# Patient Record
Sex: MALE | Race: WHITE | Employment: UNEMPLOYED | URBAN - METROPOLITAN AREA
[De-identification: names, ages, dates, MRNs, and addresses within clinical notes are randomized per-mention and may not be internally consistent; named-entity substitution may affect disease eponyms.]

---

## 2017-05-12 PROCEDURE — 84146 ASSAY OF PROLACTIN: CPT | Performed by: INTERNAL MEDICINE

## 2017-05-12 PROCEDURE — 82570 ASSAY OF URINE CREATININE: CPT | Performed by: INTERNAL MEDICINE

## 2017-05-12 PROCEDURE — 82043 UR ALBUMIN QUANTITATIVE: CPT | Performed by: INTERNAL MEDICINE

## 2017-05-16 PROBLEM — E11.40 TYPE 2 DIABETES, CONTROLLED, WITH NEUROPATHY (HCC): Status: ACTIVE | Noted: 2017-05-16

## 2017-06-06 ENCOUNTER — OFFICE VISIT (OUTPATIENT)
Dept: OPHTHALMOLOGY | Facility: CLINIC | Age: 57
End: 2017-06-06

## 2017-06-06 DIAGNOSIS — E11.3292 TYPE 2 DIABETES MELLITUS WITH LEFT EYE AFFECTED BY MILD NONPROLIFERATIVE RETINOPATHY WITHOUT MACULAR EDEMA, WITHOUT LONG-TERM CURRENT USE OF INSULIN (HCC): Primary | ICD-10-CM

## 2017-06-06 DIAGNOSIS — H25.13 AGE-RELATED NUCLEAR CATARACT OF BOTH EYES: ICD-10-CM

## 2017-06-06 PROBLEM — E11.9 DIABETES MELLITUS TYPE 2 WITHOUT RETINOPATHY (HCC): Status: ACTIVE | Noted: 2017-06-06

## 2017-06-06 PROBLEM — E11.319: Status: ACTIVE | Noted: 2017-06-06

## 2017-06-06 PROCEDURE — 92015 DETERMINE REFRACTIVE STATE: CPT | Performed by: OPHTHALMOLOGY

## 2017-06-06 PROCEDURE — 92004 COMPRE OPH EXAM NEW PT 1/>: CPT | Performed by: OPHTHALMOLOGY

## 2017-06-06 NOTE — PATIENT INSTRUCTIONS
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 No treatment necessary at this time.   Patient was instructed to m

## 2017-06-06 NOTE — ASSESSMENT & PLAN NOTE
Diabetes type II: mild background diabetic retinopathy in the left eye only, no signs of neovascularization noted. No treatment necessary at this time. Patient was instructed to monitor vision for sudden changes and to call if visual changes noted.   Disc

## 2017-06-06 NOTE — PROGRESS NOTES
Ulises Painter is a 56year old male.     HPI:     HPI     Diabetic Eye Exam    Additional comments: Pt has been a diabetic for 3 years  3 years on pills/  0 years on Insulin   Pt checks his BS once a day   Pt's last blood sugar was 150  Last HA1C was 6.6 on blood sugars twice a day (CANNOT DO 90-DAY SCRIPT AS PT OVERDUE FOR APPT) Disp: 60 strip Rfl: 1   Lancets Does not apply Misc Check BID Disp: 200 Each Rfl: 2       Allergies:    Dust                      Hornets                   Tree, [place] Right -3.00 Sphere  20/20- +2.25 20/20   Left -3.75 +1.25 100 20/20- +2.25 20/20         Final Rx      Sphere Cylinder Axis Add   Right -3.00 Sphere  +2.25   Left -3.75 +1.25 100 +2.25       Type:  Progressive bifocal                 ASSESSMENT/PLAN:

## 2017-10-10 PROCEDURE — 88305 TISSUE EXAM BY PATHOLOGIST: CPT | Performed by: INTERNAL MEDICINE

## 2017-12-17 PROBLEM — E11.65 UNCONTROLLED TYPE 2 DIABETES MELLITUS WITH HYPERGLYCEMIA, WITHOUT LONG-TERM CURRENT USE OF INSULIN (HCC): Status: ACTIVE | Noted: 2017-12-17

## 2017-12-17 PROBLEM — R79.89 ELEVATED TSH: Status: ACTIVE | Noted: 2017-12-17

## 2017-12-17 PROBLEM — E11.3292 UNCONTROLLED TYPE 2 DIABETES MELLITUS WITH LEFT EYE AFFECTED BY MILD NONPROLIFERATIVE RETINOPATHY WITHOUT MACULAR EDEMA, WITHOUT LONG-TERM CURRENT USE OF INSULIN (HCC): Status: ACTIVE | Noted: 2017-06-06

## 2017-12-17 PROBLEM — E11.319: Status: RESOLVED | Noted: 2017-06-06 | Resolved: 2017-12-17

## 2017-12-17 PROBLEM — E11.65 UNCONTROLLED TYPE 2 DIABETES MELLITUS WITH LEFT EYE AFFECTED BY MILD NONPROLIFERATIVE RETINOPATHY WITHOUT MACULAR EDEMA, WITHOUT LONG-TERM CURRENT USE OF INSULIN (HCC): Status: ACTIVE | Noted: 2017-06-06

## 2017-12-17 PROBLEM — Z28.21 INFLUENZA VACCINATION DECLINED BY PATIENT: Status: ACTIVE | Noted: 2017-12-17

## 2017-12-17 PROBLEM — E11.40 TYPE 2 DIABETES, CONTROLLED, WITH NEUROPATHY (HCC): Status: RESOLVED | Noted: 2017-05-16 | Resolved: 2017-12-17

## 2018-06-05 PROCEDURE — 84681 ASSAY OF C-PEPTIDE: CPT | Performed by: INTERNAL MEDICINE

## 2018-06-05 PROCEDURE — 82043 UR ALBUMIN QUANTITATIVE: CPT | Performed by: INTERNAL MEDICINE

## 2018-06-05 PROCEDURE — 82570 ASSAY OF URINE CREATININE: CPT | Performed by: INTERNAL MEDICINE

## 2018-06-05 PROCEDURE — 84146 ASSAY OF PROLACTIN: CPT | Performed by: INTERNAL MEDICINE

## 2018-10-09 PROBLEM — Z72.89 ALCOHOL USE: Status: ACTIVE | Noted: 2018-10-09

## 2019-04-09 PROBLEM — Z79.4 LONG-TERM INSULIN USE (HCC): Status: ACTIVE | Noted: 2019-04-09

## 2021-10-04 PROCEDURE — 88305 TISSUE EXAM BY PATHOLOGIST: CPT

## 2021-10-05 ENCOUNTER — HOSPITAL ENCOUNTER (OUTPATIENT)
Dept: LAB | Age: 61
Discharge: HOME | End: 2021-10-05

## (undated) NOTE — Clinical Note
June 6, 2017    [address]     Patient: Moni España   YOB: 1960   Date of Visit: 6/6/2017       Dear Dr. Afia Pena MD:    Thank you for referring Maria Fernanda Story to me for evaluation. before)      Medications:    Current Outpatient Prescriptions:  glipiZIDE 5 MG Oral Tab Take 2 tablets with breakfast and dinner Disp: 360 tablet Rfl: 1   ONETOUCH ULTRA BLUE In Vitro Strip CHECK BLOOD SUGARS TWICE A DAY Disp: 200 strip Rfl: Macula Normal- no BDR RPE mottling above fovea- few MA below fovea- few FSH nasal to disc    Vessels Normal Normal    Periphery Normal Normal            Refraction     Wearing Rx      Sphere Cylinder Axis Add   Right -2.50 +0.00 000 +1.75   Left -3.75 +1.

## (undated) NOTE — MR AVS SNAPSHOT
[place]  [address]  [phone]               Thank you for choosing us for your health care visit with Sydnie Rizzo MD.  We are glad to serve you and happy to provide you with this summa This list is accurate as of: 6/6/17 11:36 AM.  Always use your most recent med list.                glipiZIDE 5 MG Tabs   Take 2 tablets with breakfast and dinner   Commonly known as:  GLUCOTROL           * Glucose Blood Strp   Check blood sugars twice a d